# Patient Record
Sex: FEMALE | Race: BLACK OR AFRICAN AMERICAN
[De-identification: names, ages, dates, MRNs, and addresses within clinical notes are randomized per-mention and may not be internally consistent; named-entity substitution may affect disease eponyms.]

---

## 2017-03-18 ENCOUNTER — HOSPITAL ENCOUNTER (EMERGENCY)
Dept: HOSPITAL 62 - ER | Age: 57
Discharge: HOME | End: 2017-03-18
Payer: SELF-PAY

## 2017-03-18 VITALS — SYSTOLIC BLOOD PRESSURE: 114 MMHG | DIASTOLIC BLOOD PRESSURE: 81 MMHG

## 2017-03-18 DIAGNOSIS — R42: ICD-10-CM

## 2017-03-18 DIAGNOSIS — R61: ICD-10-CM

## 2017-03-18 DIAGNOSIS — I10: ICD-10-CM

## 2017-03-18 DIAGNOSIS — R05: ICD-10-CM

## 2017-03-18 DIAGNOSIS — R07.89: Primary | ICD-10-CM

## 2017-03-18 DIAGNOSIS — R51: ICD-10-CM

## 2017-03-18 DIAGNOSIS — R11.0: ICD-10-CM

## 2017-03-18 LAB
ALBUMIN SERPL-MCNC: 4.2 G/DL (ref 3.5–5)
ALP SERPL-CCNC: 98 U/L (ref 38–126)
ALT SERPL-CCNC: 28 U/L (ref 9–52)
ANION GAP SERPL CALC-SCNC: 14 MMOL/L (ref 5–19)
AST SERPL-CCNC: 22 U/L (ref 14–36)
BASOPHILS # BLD AUTO: 0 10^3/UL (ref 0–0.2)
BASOPHILS NFR BLD AUTO: 0.6 % (ref 0–2)
BILIRUB DIRECT SERPL-MCNC: 0 MG/DL (ref 0–0.3)
BILIRUB SERPL-MCNC: 0.6 MG/DL (ref 0.2–1.3)
BUN SERPL-MCNC: 14 MG/DL (ref 7–20)
CALCIUM: 9.9 MG/DL (ref 8.4–10.2)
CHLORIDE SERPL-SCNC: 101 MMOL/L (ref 98–107)
CK MB SERPL-MCNC: 0.73 NG/ML (ref ?–4.55)
CK SERPL-CCNC: 135 U/L (ref 30–135)
CO2 SERPL-SCNC: 26 MMOL/L (ref 22–30)
CREAT SERPL-MCNC: 0.93 MG/DL (ref 0.52–1.25)
EOSINOPHIL # BLD AUTO: 0.2 10^3/UL (ref 0–0.6)
EOSINOPHIL NFR BLD AUTO: 2 % (ref 0–6)
ERYTHROCYTE [DISTWIDTH] IN BLOOD BY AUTOMATED COUNT: 14.6 % (ref 11.5–14)
GLUCOSE SERPL-MCNC: 101 MG/DL (ref 75–110)
HCT VFR BLD CALC: 36.5 % (ref 36–47)
HGB BLD-MCNC: 12 G/DL (ref 12–15.5)
HGB HCT DIFFERENCE: -0.5
LYMPHOCYTES # BLD AUTO: 2.7 10^3/UL (ref 0.5–4.7)
LYMPHOCYTES NFR BLD AUTO: 30.4 % (ref 13–45)
MCH RBC QN AUTO: 29.6 PG (ref 27–33.4)
MCHC RBC AUTO-ENTMCNC: 33 G/DL (ref 32–36)
MCV RBC AUTO: 90 FL (ref 80–97)
MONOCYTES # BLD AUTO: 0.6 10^3/UL (ref 0.1–1.4)
MONOCYTES NFR BLD AUTO: 6.6 % (ref 3–13)
NEUTROPHILS # BLD AUTO: 5.3 10^3/UL (ref 1.7–8.2)
NEUTS SEG NFR BLD AUTO: 60.4 % (ref 42–78)
POTASSIUM SERPL-SCNC: 4.2 MMOL/L (ref 3.6–5)
PROT SERPL-MCNC: 7.8 G/DL (ref 6.3–8.2)
RBC # BLD AUTO: 4.07 10^6/UL (ref 3.72–5.28)
SODIUM SERPL-SCNC: 141.3 MMOL/L (ref 137–145)
TROPONIN I SERPL-MCNC: < 0.012 NG/ML
WBC # BLD AUTO: 8.8 10^3/UL (ref 4–10.5)

## 2017-03-18 PROCEDURE — 85379 FIBRIN DEGRADATION QUANT: CPT

## 2017-03-18 PROCEDURE — 36415 COLL VENOUS BLD VENIPUNCTURE: CPT

## 2017-03-18 PROCEDURE — 93005 ELECTROCARDIOGRAM TRACING: CPT

## 2017-03-18 PROCEDURE — 85025 COMPLETE CBC W/AUTO DIFF WBC: CPT

## 2017-03-18 PROCEDURE — 80053 COMPREHEN METABOLIC PANEL: CPT

## 2017-03-18 PROCEDURE — 93010 ELECTROCARDIOGRAM REPORT: CPT

## 2017-03-18 PROCEDURE — 82553 CREATINE MB FRACTION: CPT

## 2017-03-18 PROCEDURE — 71275 CT ANGIOGRAPHY CHEST: CPT

## 2017-03-18 PROCEDURE — 71010: CPT

## 2017-03-18 PROCEDURE — 82550 ASSAY OF CK (CPK): CPT

## 2017-03-18 PROCEDURE — 96374 THER/PROPH/DIAG INJ IV PUSH: CPT

## 2017-03-18 PROCEDURE — 96361 HYDRATE IV INFUSION ADD-ON: CPT

## 2017-03-18 PROCEDURE — 99285 EMERGENCY DEPT VISIT HI MDM: CPT

## 2017-03-18 PROCEDURE — 84484 ASSAY OF TROPONIN QUANT: CPT

## 2017-03-18 NOTE — ER DOCUMENT REPORT
ED Medical Screen (RME)





- General


Stated Complaint: CHEST PAIN


Notes: 


Patient is a 56 old female presents with chest pain that started this AM, 

sudden onset, sharp lasting 30 minutes and then resolved. she states she had 

three episodes. denies h/o chest pain, MI. admits to chest pain described as a 

pressure with shortness of breath. 





(+) HTN, DM, former smoker, FH (+) MI in brother and dad


(-) HLD





I have greeted and performed a rapid initial assessment of this patient. A 

comprehensive ED assessment and evaluation of the patient, analysis of test 

results and completion of the medical decision making process will be conducted 

by additional ED providers.





TRAVEL OUTSIDE OF THE [place] IN LAST 30 DAYS: No





- Related Data


Allergies/Adverse Reactions: 


 





No Known Allergies Allergy (Unverified 08/31/15 10:41)


 











Past Medical History





- Past Medical History


Cardiac Medical History: Reports: Hx Hypertension - meds 10 yrs


   Denies: Hx Coronary Artery Disease, Hx Heart Attack


Pulmonary Medical History: 


   Denies: Hx Asthma, Hx Bronchitis, Hx COPD, Hx Pneumonia


Neurological Medical History: Denies: Hx Cerebrovascular Accident, Hx Seizures


Endocrine Medical History: Reports: Hx Diabetes Mellitus Type 2


Musculoskeltal Medical History: Reports Hx Arthritis - hands,lt knee


Past Surgical History: Reports: Hx Orthopedic Surgery - L wrist, Hx Tubal 

Ligation.  Denies: Hx Pacemaker





- Immunizations


Hx Diphtheria, Pertussis, Tetanus Vaccination: Yes





Physical Exam





- Vital signs


Vitals: 





 











Temp Pulse Resp BP Pulse Ox


 


 98.3 F   102 H  18   122/84   99 


 


 03/18/17 16:42  03/18/17 16:42  03/18/17 16:42  03/18/17 16:42  03/18/17 16:42














Course





- Vital Signs


Vital signs: 





 











Temp Pulse Resp BP Pulse Ox


 


 98.3 F   102 H  18   122/84   99 


 


 03/18/17 16:42  03/18/17 16:42  03/18/17 16:42  03/18/17 16:42  03/18/17 16:42

## 2017-03-18 NOTE — EKG REPORT
SEVERITY:- BORDERLINE ECG -

SINUS RHYTHM

BORDERLINE T ABNORMALITIES, ANT-LAT LEADS

:

Confirmed by: Daylin Sawyer MD 18-Mar-2017 20:31:42

## 2017-03-18 NOTE — ER DOCUMENT REPORT
ED Cardiac





- General


Mode of Arrival: Ambulatory


Information source: Patient, Friend


TRAVEL OUTSIDE OF THE [place] IN LAST 30 DAYS: No





- HPI


Patient complains to provider of: Chest pain


Associated symptoms: Other - See above





<DUFFYTAMIA - Last Filed: 03/18/17 17:28>





<SRI HOLDEN - Last Filed: 03/18/17 20:35>





- General


Chief Complaint: Chest Pain


Stated Complaint: CHEST PAIN


Notes: 


Patient is a 56 year old female, with a past medical history including HTN, who 

presents to the emergency department complaining of chest pain onset this 

morning. Patient reports the pain started when she woke up at 0700, is located 

in her upper left chest, and has been intermittent throughout the day. Patient 

states the pain is accompanied by dizziness. nausea, diaphoresis, and a 

headache that has been constant all day. Patient also complains of shortness of 

breath that occurred with the pain while she was driving to the ED. Patient 

reports she has been coughing (non-productive) for the past few days and that 

it is painful to cough. While in exam room patient experienced upper abdominal 

pain after coughing. Patient denies fever.





PCP: Dr. Vail, [facility] (TAMIA DUFFY)





- Related Data


Allergies/Adverse Reactions: 


 





No Known Allergies Allergy (Verified 03/18/17 16:51)


 











Past Medical History





- General


Information source: Patient





- Social History


Smoking Status: Unknown if Ever Smoked


Chew tobacco use (# tins/day): No


Frequency of alcohol use: None


Drug Abuse: None


Family History: Reviewed & Not Pertinent


Patient has suicidal ideation: No


Patient has homicidal ideation: No





- Past Medical History


Cardiac Medical History: Reports: Hx Hypertension - meds 10 yrs


Endocrine Medical History: Reports: Hx Diabetes Mellitus Type 2


Musculoskeltal Medical History: Reports Hx Arthritis - hands,lt knee


Past Surgical History: Reports: Hx Orthopedic Surgery - L wrist, Hx Tubal 

Ligation





- Immunizations


Hx Diphtheria, Pertussis, Tetanus Vaccination: Yes





<TAMIA DUFFY - Last Filed: 03/18/17 17:28>





Review of Systems





- Review of Systems


Constitutional: See HPI, Diaphoresis.  denies: Fever


EENT: No symptoms reported


Cardiovascular: See HPI, Chest pain, Dizziness


Respiratory: See HPI, Cough, Short of breath


Gastrointestinal: See HPI, Abdominal pain, Nausea


Genitourinary: No symptoms reported


Female Genitourinary: No symptoms reported


Musculoskeletal: No symptoms reported


Skin: No symptoms reported


Hematologic/Lymphatic: No symptoms reported


Neurological/Psychological: See HPI, Headaches


-: Yes All other systems reviewed and negative





<TAMIA DUFFY - Last Filed: 03/18/17 17:28>





Physical Exam





- Vital signs


Interpretation: Normal





- General


General appearance: Appears well, Alert





- HEENT


Head: Normocephalic, Atraumatic


Pharynx: Normal





- Respiratory


Respiratory status: No respiratory distress


Chest status: Tender - Left anterior chest wall tender to palpation


Breath sounds: Normal


Chest palpation: Normal





- Cardiovascular


Rhythm: Regular


Heart sounds: Normal auscultation


Murmur: No





- Abdominal


Inspection: Normal


Distension: No distension


Bowel sounds: Normal


Tenderness: Nontender


Organomegaly: No organomegaly





- Back


Back: Normal, Nontender





- Extremities


General upper extremity: Normal inspection


General lower extremity: Normal inspection





- Neurological


Neuro grossly intact: Yes


Cognition: Normal


Orientation: AAOx4


Nico Coma Scale Eye Opening: Spontaneous


Nico Coma Scale Verbal: Oriented


Nico Coma Scale Motor: Obeys Commands


Nico Coma Scale Total: 15


Speech: Normal





- Psychological


Associated symptoms: Normal affect, Normal mood





- Skin


Skin Temperature: Warm


Skin Moisture: Dry


Skin Color: Normal





<TAMIA DUFFY - Last Filed: 03/18/17 17:28>





Course





- Laboratory


Result Diagrams: 


 03/18/17 17:00





 03/18/17 17:00





<TAMIA DUFFY - Last Filed: 03/18/17 17:28>





- Laboratory


Result Diagrams: 


 03/18/17 17:00





 03/18/17 17:00





- Diagnostic Test


Radiology reviewed: Image reviewed, Reports reviewed - CT chest is unremarkable





<SRI HOLDEN - Last Filed: 03/18/17 20:35>





- Vital Signs


Vital signs: 





 











Temp Pulse Resp BP Pulse Ox


 


 98.3 F   102 H  14   122/84   98 


 


 03/18/17 16:42  03/18/17 16:42  03/18/17 17:37  03/18/17 16:42  03/18/17 17:37














- Laboratory


Laboratory results interpreted by me: 





 











  03/18/17 03/18/17





  17:00 17:00


 


RDW  14.6 H 


 


D-Dimer   0.71 H














Discharge





<TAMIA DUFFY - Last Filed: 03/18/17 17:28>





<SRI HOLDEN - Last Filed: 03/18/17 20:35>





- Discharge


Clinical Impression: 


 Anterior chest wall pain





Condition: Stable


Disposition: HOME, SELF-CARE


Additional Instructions: 


Chest Pain of Unclear Cause:





   The exact cause of your chest pain isn't clear. Fortunately, there is no 

evidence of a dangerous medical condition.  Further testing may be required to 

find the source of the pain.


   Most often, we find that this pain is coming from the chest wall -- the 

muscles or rib joints in the chest.  But chest pain can come from the lung and 

lung lining, the esophagus, the heart valves or heart lining, and even the 

stomach or gallbladder.


   Rest.  Eat lightly until the pain is gone.  We may prescribe medicine for 

pain and inflammation.


   You should call the physician immediately if the pain radiates to the 

shoulder, jaw or arms; if you start to run a fever or develop a cough; or if 

you develop shortness of breath, or other new or alarming symptoms.











FOLLOW UP WITH YOUR DOCTOR IF NOT IMPROVING.





RETURN TO THE EMERGENCY ROOM IF ANY NEW OR WORSENING SYMPTOMS.








Scribe Attestation: 





03/18/17 20:35


I personally performed the services described in the documentation, reviewed 

and edited the documentation which was dictated to the scribe in my presence, 

and it accurately records my words and actions. (SRI HOLDEN)





Anaibe Documentation





- Scribe


Written by Zoë:: zoë Ham, 3/18/17, [id number]


acting as scribe for :: Carlos





<TAMIA DUFFY - Last Filed: 03/18/17 17:28>

## 2017-05-11 ENCOUNTER — HOSPITAL ENCOUNTER (OUTPATIENT)
Dept: HOSPITAL 62 - CCC | Age: 57
End: 2017-05-11
Payer: COMMERCIAL

## 2017-05-11 DIAGNOSIS — E11.9: ICD-10-CM

## 2017-05-11 DIAGNOSIS — I10: Primary | ICD-10-CM

## 2017-05-11 LAB
ALBUMIN SERPL-MCNC: 4.3 G/DL (ref 3.5–5)
ALP SERPL-CCNC: 84 U/L (ref 38–126)
ALT SERPL-CCNC: 31 U/L (ref 9–52)
ANION GAP SERPL CALC-SCNC: 12 MMOL/L (ref 5–19)
AST SERPL-CCNC: 24 U/L (ref 14–36)
BASOPHILS # BLD AUTO: 0.1 10^3/UL (ref 0–0.2)
BASOPHILS NFR BLD AUTO: 1.2 % (ref 0–2)
BILIRUB DIRECT SERPL-MCNC: 0.3 MG/DL (ref 0–0.4)
BILIRUB SERPL-MCNC: 0.4 MG/DL (ref 0.2–1.3)
BUN SERPL-MCNC: 14 MG/DL (ref 7–20)
CALCIUM: 9.4 MG/DL (ref 8.4–10.2)
CHLORIDE SERPL-SCNC: 102 MMOL/L (ref 98–107)
CHOLEST SERPL-MCNC: 234.35 MG/DL (ref 0–200)
CO2 SERPL-SCNC: 27 MMOL/L (ref 22–30)
CREAT SERPL-MCNC: 0.93 MG/DL (ref 0.52–1.25)
DIRECT HDL: 66 MG/DL (ref 40–?)
EOSINOPHIL # BLD AUTO: 0.3 10^3/UL (ref 0–0.6)
EOSINOPHIL NFR BLD AUTO: 4.4 % (ref 0–6)
ERYTHROCYTE [DISTWIDTH] IN BLOOD BY AUTOMATED COUNT: 14.7 % (ref 11.5–14)
GLUCOSE SERPL-MCNC: 119 MG/DL (ref 75–110)
HCT VFR BLD CALC: 36.5 % (ref 36–47)
HGB BLD-MCNC: 12.1 G/DL (ref 12–15.5)
HGB HCT DIFFERENCE: -0.2
LDLC SERPL DIRECT ASSAY-MCNC: 107 MG/DL (ref ?–100)
LYMPHOCYTES # BLD AUTO: 2.1 10^3/UL (ref 0.5–4.7)
LYMPHOCYTES NFR BLD AUTO: 32.7 % (ref 13–45)
MCH RBC QN AUTO: 30.4 PG (ref 27–33.4)
MCHC RBC AUTO-ENTMCNC: 33.1 G/DL (ref 32–36)
MCV RBC AUTO: 92 FL (ref 80–97)
MONOCYTES # BLD AUTO: 0.5 10^3/UL (ref 0.1–1.4)
MONOCYTES NFR BLD AUTO: 7 % (ref 3–13)
NEUTROPHILS # BLD AUTO: 3.6 10^3/UL (ref 1.7–8.2)
NEUTS SEG NFR BLD AUTO: 54.7 % (ref 42–78)
POTASSIUM SERPL-SCNC: 4.4 MMOL/L (ref 3.6–5)
PROT SERPL-MCNC: 7.7 G/DL (ref 6.3–8.2)
RBC # BLD AUTO: 3.97 10^6/UL (ref 3.72–5.28)
SODIUM SERPL-SCNC: 140.6 MMOL/L (ref 137–145)
TRIGL SERPL-MCNC: 72 MG/DL (ref ?–150)
VLDLC SERPL CALC-MCNC: 14 MG/DL (ref 10–31)
WBC # BLD AUTO: 6.6 10^3/UL (ref 4–10.5)

## 2017-05-11 PROCEDURE — 84443 ASSAY THYROID STIM HORMONE: CPT

## 2017-05-11 PROCEDURE — 83036 HEMOGLOBIN GLYCOSYLATED A1C: CPT

## 2017-05-11 PROCEDURE — 36415 COLL VENOUS BLD VENIPUNCTURE: CPT

## 2017-05-11 PROCEDURE — 80061 LIPID PANEL: CPT

## 2017-05-11 PROCEDURE — 80053 COMPREHEN METABOLIC PANEL: CPT

## 2017-05-11 PROCEDURE — 85025 COMPLETE CBC W/AUTO DIFF WBC: CPT

## 2017-05-25 ENCOUNTER — HOSPITAL ENCOUNTER (OUTPATIENT)
Dept: HOSPITAL 62 - WI | Age: 57
End: 2017-05-25
Payer: COMMERCIAL

## 2017-05-25 DIAGNOSIS — Z12.31: Primary | ICD-10-CM

## 2017-05-25 PROCEDURE — G0202 SCR MAMMO BI INCL CAD: HCPCS

## 2017-05-25 PROCEDURE — 77067 SCR MAMMO BI INCL CAD: CPT

## 2017-05-25 NOTE — WOMENS IMAGING REPORT
EXAM DESCRIPTION:  BILAT SCREENING MAMMO W/CAD



COMPLETED DATE/TIME:  5/25/2017 9:03 am



REASON FOR STUDY:  Z12.31, ROUTINE SCREENING MAMMO Z12.31  ENCNTR SCREEN MAMMOGRAM FOR MALIGNANT NEOP
LASM OF LEYDA



COMPARISON:  2009 to 2014



TECHNIQUE:  Standard craniocaudal and mediolateral oblique views of each breast recorded using Set.fma
l acquisition.



LIMITATIONS:  None.



FINDINGS:  Findings present which are benign by mammographic criteria.  No suspicious masses, calcifi
cations or architectural distortion.

Pertinent benign findings: Stable right breast mass.

Read with the assistance of CAD.

.[facility] - R2 Cenova Version 1.3

.[facility] - R2 Cenova Version 1.3

.[facility] - R2 Cenova Version 2.4

.[facility] [place] R2 Cenova Version 2.4

.[facility] - R2  Version 9.2

Benign mammographic findings may include one or more of the following:  Smooth masses, popcorn/rim/co
arse calcifications, asymmetries, post-procedure changes, and lesions with long-standing stability.



IMPRESSION:  BENIGN MAMMOGRAPHIC FINDINGS.  BIRADS 2



BREAST DENSITY:  b. There are scattered areas of fibroglandular density.



BIRAD:  2 BENIGN FINDING(S)



RECOMMENDATION:  ROUTINE SCREENING



COMMENT:  The patient has been notified of the results by letter per MQSA requirements. Additional no
tification policies are in place for contacting patient with suspicious or incomplete findings.

Quality ID #225: The American College of Radiology recommends an annual screening mammogram for women
 aged 40 years or over. This facility utilizes a reminder system to ensure that all patients receive 
reminder letters, and/or direct phone calls for appointments. This includes reminders for routine scr
eening mammograms, diagnostic mammograms, or other Breast Imaging Interventions when appropriate.  Th
is patient will be placed in the appropriate reminder system.

The American College of Radiology (ACR) has developed recommendations for screening MRI of the breast
s in certain patient populations, to be used in conjunction with mammography.  Breast MRI surveillanc
e may be appropriate for women with more than 20% lifetime risk of developing breast cancer  as deter
mined by genetic testing, significant family history of the disease, or history of mantle radiation f
or Hodgkins Disease.  ACR Practice Guidelines 2008.



TECHNICAL DOCUMENTATION:  FINDING NUMBER: (1)

ASSESSMENT: (1)

JOB ID:  [id number]

 2011 [employer]- All Rights Reserved

## 2017-06-26 ENCOUNTER — HOSPITAL ENCOUNTER (EMERGENCY)
Dept: HOSPITAL 62 - ER | Age: 57
Discharge: HOME | End: 2017-06-26
Payer: COMMERCIAL

## 2017-06-26 VITALS — SYSTOLIC BLOOD PRESSURE: 139 MMHG | DIASTOLIC BLOOD PRESSURE: 90 MMHG

## 2017-06-26 DIAGNOSIS — M77.32: Primary | ICD-10-CM

## 2017-06-26 DIAGNOSIS — E11.9: ICD-10-CM

## 2017-06-26 DIAGNOSIS — I10: ICD-10-CM

## 2017-06-26 PROCEDURE — 99283 EMERGENCY DEPT VISIT LOW MDM: CPT

## 2017-06-26 NOTE — ER DOCUMENT REPORT
HPI





- HPI


Patient complains to provider of: heel pain


Onset: Other - at least one month


Onset/Duration: Persistent


Quality of pain: Achy, Throbbing


Pain Level: 4


Exacerbated by: Movement, Walking


Relieved by: Supine, Sitting, Remaining still


Similar symptoms previously: No


Recently seen / treated by doctor: No





- DERM


Skin Color: Normal, Pink





Past Medical History





- Social History


Smoking Status: Never Smoker


Chew tobacco use (# tins/day): No


Frequency of alcohol use: None


Drug Abuse: None


Family History: Reviewed & Not Pertinent


Patient has suicidal ideation: No


Patient has homicidal ideation: No





- Past Medical History


Cardiac Medical History: Reports: Hx Hypertension - meds 10 yrs


   Denies: Hx Coronary Artery Disease, Hx Heart Attack


Pulmonary Medical History: 


   Denies: Hx Asthma, Hx Bronchitis, Hx COPD, Hx Pneumonia


Neurological Medical History: Denies: Hx Cerebrovascular Accident, Hx Seizures


Endocrine Medical History: Reports: Hx Diabetes Mellitus Type 2


Renal/ Medical History: Denies: Hx Peritoneal Dialysis


Musculoskeltal Medical History: Reports Hx Arthritis - hands,lt knee


Past Surgical History: Reports: Hx Orthopedic Surgery - L wrist, Hx Tubal 

Ligation.  Denies: Hx Pacemaker





- Immunizations


Hx Diphtheria, Pertussis, Tetanus Vaccination: Yes





Vertical Provider Document





- CONSTITUTIONAL


Agree With Documented VS: Yes


Exam Limitations: No Limitations


General Appearance: WD/WN, No Apparent Distress





- INFECTION CONTROL


TRAVEL OUTSIDE OF THE [place] IN LAST 30 DAYS: No





- RESPIRATORY


O2 Sat by Pulse Oximetry: 97





- CARDIOVASCULAR


Pulses: Normal: Radial, Dorsalis pedis





- MUSCULOSKELETAL/EXTREMETIES


Musculoskeletal/Extremeties: MAEW, FROM, Tender - over left heel and sole of 

the foot, No Edema.  negative: Eccymosis





- NEURO


Level of Consciousness: Awake, Alert, Appropriate


Motor/Sensory: No Motor Deficit, No Sensory Deficit





- DERM


Integumentary: Warm, Dry, No Rash.  negative: Laceration





Course





- Re-evaluation


Re-evalutation: 





06/26/17 23:28


Patient is a 56 year old female who is HDS, distress afebrile.  Patient with 

evidence of calcaneal heel spur on x-ray.  Patient educated on calcaneal heel 

spurs in plantar fasciitis, over-the-counter management to follow-up with 

primary care.








- Vital Signs


Vital signs: 


 











Temp Pulse Resp BP Pulse Ox


 


 97.6 F   91   20   147/77 H  97 


 


 06/26/17 14:06  06/26/17 14:06  06/26/17 14:06  06/26/17 14:06  06/26/17 14:06














- Diagnostic Test


Radiology reviewed: Image reviewed, Reports reviewed





Discharge





- Discharge


Clinical Impression: 


 Heel spur





Condition: Good


Disposition: HOME, SELF-CARE


Instructions:  Plantar Fasciitis or Heel Spur ([facility]), Ice Packs ([facility]), Use of 

Over-The-Counter Ibuprofen ([facility])


Forms:  Elevated Blood Pressure


Referrals: 


[facility] [NO [facility]AL MD] - Follow up as needed

## 2017-06-26 NOTE — RADIOLOGY REPORT (SQ)
EXAM DESCRIPTION:  FOOT LEFT COMPLETE



COMPLETED DATE/TIME:  6/26/2017 4:41 pm



REASON FOR STUDY:  heel pain



COMPARISON:  None.



NUMBER OF VIEWS:  Three views.



TECHNIQUE:  AP, lateral and oblique  radiographic images acquired of the left foot.



LIMITATIONS:  None.



FINDINGS:  MINERALIZATION: Normal.

BONES: Old healed avulsion fragment off the medial base left great toe proximal phalanx marked with a
n arrow unchanged.  No acute fracture.  Small plantar calcaneal spur.

JOINTS: No effusions.

SOFT TISSUES: No soft tissue swelling.  No foreign body.

OTHER: No other significant finding.



IMPRESSION:  Small calcaneal plantar spur

Old well corticated avulsion fragment off the medial base left great toe proximal phalanx



TECHNICAL DOCUMENTATION:  JOB ID:  [id number]

 2011 [employer]- All Rights Reserved

## 2017-11-15 ENCOUNTER — HOSPITAL ENCOUNTER (OUTPATIENT)
Dept: HOSPITAL 62 - LAB | Age: 57
End: 2017-11-15
Attending: ORTHOPAEDIC SURGERY
Payer: COMMERCIAL

## 2017-11-15 DIAGNOSIS — M17.11: Primary | ICD-10-CM

## 2017-11-15 PROCEDURE — 36415 COLL VENOUS BLD VENIPUNCTURE: CPT

## 2017-11-15 PROCEDURE — 83036 HEMOGLOBIN GLYCOSYLATED A1C: CPT

## 2017-11-22 ENCOUNTER — HOSPITAL ENCOUNTER (OUTPATIENT)
Dept: HOSPITAL 62 - RAD | Age: 57
End: 2017-11-22
Attending: ORTHOPAEDIC SURGERY
Payer: MEDICAID

## 2017-11-22 DIAGNOSIS — M54.12: Primary | ICD-10-CM

## 2017-11-22 PROCEDURE — 72141 MRI NECK SPINE W/O DYE: CPT

## 2017-11-22 NOTE — RADIOLOGY REPORT (SQ)
EXAM DESCRIPTION:  MRI CERVICAL SPINE WITHOUT



COMPLETED DATE/TIME:  11/22/2017 10:44 am



REASON FOR STUDY:  CERVICAL RADICULOPATHY (M54.12) M54.12  RADICULOPATHY, CERVICAL REGION



COMPARISON:  None.



TECHNIQUE:  Sagittal and Axial imaging includes T1, T2, STIR and gradient echo sequences.



LIMITATIONS:  None.



FINDINGS:  ALIGNMENT: Normal.

VERTEBRAE: Intact.

BONE MARROW: Normal. No marrow replacement or reactive changes.

DISCS: Diffuse decreased T2 weighted intervertebral disc signal.  No high-grade disc space loss of he
ight

HARDWARE: None in the spine.

CORD AND BASE OF BRAIN: Normal in size and signal intensity.

SOFT TISSUES: No soft tissue masses.

C1-C2: No significant spinal stenosis.

C2-C3: No significant spinal stenosis or exit foraminal stenosis.

C3-C4: No central canal or left foraminal narrowing.  Moderate right foraminal stenosis from facet an
d uncovertebral hypertrophy.

C4-C5: No central canal narrowing or right foraminal stenosis.  Moderate left foraminal narrowing fro
m facet and uncovertebral hypertrophy.

C5-C6: No significant central or right foraminal narrowing.  Mild left foraminal narrowing.

C6-C7: Broad diffuse posterior disc bulging is present with a small central protrusion.  This partly 
effaces the ventral thecal sac and abuts the ventral cord without cord flattening or abnormal intrins
ic cord signal.  No significant central stenosis.  No foraminal narrowing.

C7-T1: No significant spinal stenosis or exit foraminal stenosis.

UPPER THORACIC: Incompletely imaged. No significant spinal stenosis or exit foraminal stenosis.

OTHER: No other significant finding.



IMPRESSION:  Moderate right foraminal narrowing at C3-4, moderate left foraminal narrowing at C4-5.



TECHNICAL DOCUMENTATION:  JOB ID:  [id number]

 [employer]- All Rights Reserved

## 2017-12-12 ENCOUNTER — HOSPITAL ENCOUNTER (OUTPATIENT)
Dept: HOSPITAL 62 - LAB | Age: 57
End: 2017-12-12
Attending: ORTHOPAEDIC SURGERY
Payer: MEDICAID

## 2017-12-12 DIAGNOSIS — Z11.2: ICD-10-CM

## 2017-12-12 DIAGNOSIS — I10: Primary | ICD-10-CM

## 2017-12-12 LAB
ALBUMIN SERPL-MCNC: 4.7 G/DL (ref 3.5–5)
ALP SERPL-CCNC: 100 U/L (ref 38–126)
ALT SERPL-CCNC: 39 U/L (ref 9–52)
ANION GAP SERPL CALC-SCNC: 14 MMOL/L (ref 5–19)
AST SERPL-CCNC: 21 U/L (ref 14–36)
BASOPHILS # BLD AUTO: 0.1 10^3/UL (ref 0–0.2)
BASOPHILS NFR BLD AUTO: 0.8 % (ref 0–2)
BILIRUB DIRECT SERPL-MCNC: 0.4 MG/DL (ref 0–0.4)
BILIRUB SERPL-MCNC: 0.6 MG/DL (ref 0.2–1.3)
BUN SERPL-MCNC: 12 MG/DL (ref 7–20)
CALCIUM: 9.9 MG/DL (ref 8.4–10.2)
CHLORIDE SERPL-SCNC: 99 MMOL/L (ref 98–107)
CO2 SERPL-SCNC: 27 MMOL/L (ref 22–30)
CREAT SERPL-MCNC: 0.86 MG/DL (ref 0.52–1.25)
EOSINOPHIL # BLD AUTO: 0.3 10^3/UL (ref 0–0.6)
EOSINOPHIL NFR BLD AUTO: 4.2 % (ref 0–6)
ERYTHROCYTE [DISTWIDTH] IN BLOOD BY AUTOMATED COUNT: 14.2 % (ref 11.5–14)
GLUCOSE SERPL-MCNC: 94 MG/DL (ref 75–110)
HCT VFR BLD CALC: 37.6 % (ref 36–47)
HGB BLD-MCNC: 12.8 G/DL (ref 12–15.5)
HGB HCT DIFFERENCE: 0.8
LYMPHOCYTES # BLD AUTO: 3.1 10^3/UL (ref 0.5–4.7)
LYMPHOCYTES NFR BLD AUTO: 40 % (ref 13–45)
MCH RBC QN AUTO: 30.3 PG (ref 27–33.4)
MCHC RBC AUTO-ENTMCNC: 34 G/DL (ref 32–36)
MCV RBC AUTO: 89 FL (ref 80–97)
MONOCYTES # BLD AUTO: 0.5 10^3/UL (ref 0.1–1.4)
MONOCYTES NFR BLD AUTO: 6.9 % (ref 3–13)
NEUTROPHILS # BLD AUTO: 3.7 10^3/UL (ref 1.7–8.2)
NEUTS SEG NFR BLD AUTO: 48.1 % (ref 42–78)
POTASSIUM SERPL-SCNC: 4.3 MMOL/L (ref 3.6–5)
PROT SERPL-MCNC: 8.6 G/DL (ref 6.3–8.2)
RBC # BLD AUTO: 4.21 10^6/UL (ref 3.72–5.28)
SODIUM SERPL-SCNC: 139.5 MMOL/L (ref 137–145)
WBC # BLD AUTO: 7.8 10^3/UL (ref 4–10.5)

## 2017-12-12 PROCEDURE — 80053 COMPREHEN METABOLIC PANEL: CPT

## 2017-12-12 PROCEDURE — 85025 COMPLETE CBC W/AUTO DIFF WBC: CPT

## 2017-12-12 PROCEDURE — 36415 COLL VENOUS BLD VENIPUNCTURE: CPT

## 2017-12-12 PROCEDURE — 87070 CULTURE OTHR SPECIMN AEROBIC: CPT

## 2019-02-27 ENCOUNTER — HOSPITAL ENCOUNTER (OUTPATIENT)
Dept: HOSPITAL 62 - RAD | Age: 59
End: 2019-02-27
Payer: MEDICAID

## 2019-02-27 DIAGNOSIS — M54.12: Primary | ICD-10-CM

## 2019-02-27 PROCEDURE — 72050 X-RAY EXAM NECK SPINE 4/5VWS: CPT

## 2019-02-27 NOTE — RADIOLOGY REPORT (SQ)
EXAM DESCRIPTION:  CERV SP 4 OR 5 VIEWS



COMPLETED DATE/TIME:  2/27/2019 9:16 am



REASON FOR STUDY:  CERVICALGIA W/ RADICULOPATHY



COMPARISON:  None.



NUMBER OF VIEWS:  Five views.



TECHNIQUE:  AP, lateral, obliques and odontoid radiographic images acquired of the cervical spine.



LIMITATIONS:  None.



FINDINGS:  MINERALIZATION: Normal.

ALIGNMENT: Anatomic.

VERTEBRAE: Vertebral bodies of normal height.

DISCS: No significant osteophytes or sclerosis. Disc height maintained.

FORAMINA: No osteophytes or foraminal narrowing.

LATERAL AND POSTERIOR ELEMENTS: Mild hypertrophic facet changes seen at the C3-4 and C4-5 levels bila
terally, right more than left.

HARDWARE: None in the spine.

SOFT TISSUES: No masses or calcifications. Lung apices clear.

OTHER: No other significant finding.



IMPRESSION:  Facet arthropathy.  No acute findings.



TECHNICAL DOCUMENTATION:  JOB ID:  [id number]

 2011 [employer] All Rights Reserved



Reading location - IP/workstation name: [email or web address]

## 2019-06-13 ENCOUNTER — HOSPITAL ENCOUNTER (OUTPATIENT)
Dept: HOSPITAL 62 - RAD | Age: 59
End: 2019-06-13
Attending: ORTHOPAEDIC SURGERY
Payer: MEDICAID

## 2019-06-13 DIAGNOSIS — M75.51: Primary | ICD-10-CM

## 2019-06-13 DIAGNOSIS — M75.121: ICD-10-CM

## 2019-06-13 NOTE — RADIOLOGY REPORT (SQ)
EXAM DESCRIPTION:  MRI RT UPPER JOINT WITHOUT



COMPLETED DATE/TIME:  6/13/2019 4:29 pm



REASON FOR STUDY:  M75.51 BURSITIS OF RIGHT SHOULDER M75.51  BURSITIS OF RIGHT SHOULDER



COMPARISON:  None.



TECHNIQUE:  Right shoulder images acquired and stored on PACS. Multiplanar imaging to include fat sen
sitive sequences such as T1, water sensitive sequences such as FST2/STIR, cartilage sensitive sequenc
es such as FSPD/gradient-echo sequences.



LIMITATIONS:  Motion artifact on some of the pulse sequences



FINDINGS:  BONE MARROW AND CORTEX: Minimal edema in the anterior aspect of the right humeral head gre
ater tuberosity.  No marrow signal abnormalities worrisome for occult fracture or aggressive marrow r
eplacement process

JOINT OR BURSAL EFFUSION: Trace fluid in the subacromial/subdeltoid bursa

GLENO-HUMERAL ARTICULATION: Normal articulation. No subluxation. No cystic change. No osteophytes or 
cartilage loss.

ACROMION AND AC JOINT: Type 2 acromion with narrowing of the subacromial space from the AC joint hype
rtrophy.  Fluid in the subacromial/subdeltoid bursa.  .

ROTATOR CUFF AND INTERVAL: The supraspinatus and anterior edge infraspinatus tendon is abnormally thi
ckened and high in signal from tendinopathy.  There is a partial thickness tear in the anterior edge 
supraspinatus on coronal image 10 and axial image 9 and sagittal image 4.  Subscapularis is intact.  
Rotator interval is intact.

No rotator interval tear. No rotator interval thickening to suggest adhesive capsulitis.

 LABRUM  AND BICEPS LABRAL COMPLEX: There is motion artifact on the sagittal images.  Intra-articular
 long head biceps tendon is intact.  No gross evidence of labral tear

REMAINDER OF LABRUM AND IGHL : No gross tear or paralabral cyst formation.  Labral evaluation is less
 than optimal without joint distention. No thickening of IGHL to suggest adhesive capsulitis.

PERIARTICULAR AND ADJACENT SOFT TISSUES: No masses or abnormal nodes.

OTHER: No other significant finding.



IMPRESSION:  Tendinopathy in the supraspinatus and anterior edge infraspinatus tendon.  Small full-th
ickness tear in the supraspinatus tendon with fluid in the subacromial/subdeltoid bursa.

AC joint hypertrophy with narrowing of the subacromial space



TECHNICAL DOCUMENTATION:  JOB ID:  [id number]

 2011 [employer]- All Rights Reserved



Reading location - IP/workstation name: [email or web address]

## 2019-07-23 ENCOUNTER — HOSPITAL ENCOUNTER (EMERGENCY)
Dept: HOSPITAL 62 - ER | Age: 59
Discharge: HOME | End: 2019-07-23
Payer: COMMERCIAL

## 2019-07-23 VITALS — DIASTOLIC BLOOD PRESSURE: 72 MMHG | SYSTOLIC BLOOD PRESSURE: 136 MMHG

## 2019-07-23 DIAGNOSIS — M25.511: Primary | ICD-10-CM
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
## 2019-07-23 DIAGNOSIS — I10: ICD-10-CM

## 2019-07-23 DIAGNOSIS — R07.81: ICD-10-CM

## 2019-07-23 DIAGNOSIS — E11.9: ICD-10-CM

## 2019-07-23 PROCEDURE — 99283 EMERGENCY DEPT VISIT LOW MDM: CPT
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
## 2019-07-27 ENCOUNTER — HOSPITAL ENCOUNTER (EMERGENCY)
Dept: HOSPITAL 62 - ER | Age: 59
Discharge: HOME | End: 2019-07-27
Payer: COMMERCIAL

## 2019-07-27 VITALS — SYSTOLIC BLOOD PRESSURE: 142 MMHG | DIASTOLIC BLOOD PRESSURE: 86 MMHG

## 2019-07-27 DIAGNOSIS — Z87.891: ICD-10-CM

## 2019-07-27 DIAGNOSIS — M25.552: Primary | ICD-10-CM

## 2019-07-27 DIAGNOSIS — M25.562: ICD-10-CM

## 2019-07-27 DIAGNOSIS — I10: ICD-10-CM

## 2019-07-27 DIAGNOSIS — Z96.652: ICD-10-CM
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
## 2019-07-27 PROCEDURE — L1830 KO IMMOB CANVAS LONG PRE OTS: HCPCS

## 2019-07-27 PROCEDURE — 73502 X-RAY EXAM HIP UNI 2-3 VIEWS: CPT

## 2019-07-27 PROCEDURE — 99283 EMERGENCY DEPT VISIT LOW MDM: CPT

## 2019-07-27 PROCEDURE — 73564 X-RAY EXAM KNEE 4 OR MORE: CPT
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
## 2020-06-29 ENCOUNTER — HOSPITAL ENCOUNTER (OUTPATIENT)
Dept: HOSPITAL 62 - RDC | Age: 60
End: 2020-06-29
Attending: NURSE PRACTITIONER
Payer: MEDICAID

## 2020-06-29 VITALS — DIASTOLIC BLOOD PRESSURE: 71 MMHG | SYSTOLIC BLOOD PRESSURE: 138 MMHG

## 2020-06-29 DIAGNOSIS — E11.9: ICD-10-CM

## 2020-06-29 DIAGNOSIS — Z20.828: Primary | ICD-10-CM

## 2020-06-29 DIAGNOSIS — E03.9: ICD-10-CM

## 2020-06-29 DIAGNOSIS — Z87.891: ICD-10-CM

## 2020-06-29 DIAGNOSIS — I10: ICD-10-CM

## 2020-06-29 PROCEDURE — 87635 SARS-COV-2 COVID-19 AMP PRB: CPT

## 2020-06-29 PROCEDURE — C9803 HOPD COVID-19 SPEC COLLECT: HCPCS
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
## 2020-08-11 ENCOUNTER — HOSPITAL ENCOUNTER (OUTPATIENT)
Dept: HOSPITAL 62 - RAD | Age: 60
End: 2020-08-11
Payer: MEDICAID

## 2020-08-11 DIAGNOSIS — M25.511: Primary | ICD-10-CM

## 2020-08-11 NOTE — RADIOLOGY REPORT (SQ)
EXAM DESCRIPTION:  SHOULDER RIGHT 2 OR MORE VIEWS



IMAGES COMPLETED DATE/TIME:  8/11/2020 11:18 am



REASON FOR STUDY:  RT SHOULDER PAIN M25.511  PAIN IN RIGHT SHOULDER



COMPARISON:  None.



NUMBER OF VIEWS:  Three views.



TECHNIQUE:  Internal rotation, external rotation, and Y view images acquired of the right shoulder.



LIMITATIONS:  None.



FINDINGS:  MINERALIZATION: Normal.

BONES: No acute fracture.  No worrisome bone lesions.

JOINTS: No dislocation.

VISUALIZED LUNGS AND RIBS: No pneumothorax.  No rib fracture.

SOFT TISSUES: No radiopaque foreign body.

OTHER: No other significant finding.



IMPRESSION:  NEGATIVE STUDY OF THE RIGHT SHOULDER. NO RADIOGRAPHIC EVIDENCE OF ACUTE INJURY.



TECHNICAL DOCUMENTATION:  JOB ID:  [id number]

 2011 [employer]- All Rights Reserved



Reading location - IP/workstation name: [email or web address]